# Patient Record
Sex: FEMALE | Race: WHITE
[De-identification: names, ages, dates, MRNs, and addresses within clinical notes are randomized per-mention and may not be internally consistent; named-entity substitution may affect disease eponyms.]

---

## 2019-03-08 ENCOUNTER — HOSPITAL ENCOUNTER (OUTPATIENT)
Dept: HOSPITAL 92 - SCSULT | Age: 24
Discharge: HOME | End: 2019-03-08
Attending: NURSE PRACTITIONER
Payer: COMMERCIAL

## 2019-03-08 DIAGNOSIS — Z3A.16: ICD-10-CM

## 2019-03-08 DIAGNOSIS — Z34.02: Primary | ICD-10-CM

## 2019-03-08 PROCEDURE — 76805 OB US >/= 14 WKS SNGL FETUS: CPT

## 2019-03-08 NOTE — ULT
OB ULTRASOUND:

 

HISTORY: 

Size and dates.

 

FINDINGS: 

A single live intrauterine gestation is seen with measurements corresponding to an estimated gestatio
nal age of 16 weeks 1 day and JOHANNA at 8/22/2019.  The estimated fetal weight measures 147 gm or 5 ounc
es.

 

Fetal measurements are as follows:

BPD         3.18 cm, 16 weeks

HC         12.28 cm, 16 weeks 1 day

AC         10.24 cm, 16 weeks 2 days

FL          2.05 cm, 16 weeks 1 day

 

Fetal heart rate measures 149 b.p.m.  Placenta is anteriorly located without evidence of placenta pre
via.  Amniotic fluid within normal limits.

 

IMPRESSION: 

Single live intrauterine pregnancy of 16 weeks 1 day estimated gestational age and estimated date of 
delivery at 8/22/2019.

 

POS: OFF

## 2019-04-17 ENCOUNTER — HOSPITAL ENCOUNTER (OUTPATIENT)
Dept: HOSPITAL 92 - SCSULT | Age: 24
Discharge: HOME | End: 2019-04-17
Attending: OBSTETRICS & GYNECOLOGY
Payer: COMMERCIAL

## 2019-04-17 DIAGNOSIS — Z34.82: Primary | ICD-10-CM

## 2019-04-17 DIAGNOSIS — Z3A.21: ICD-10-CM

## 2019-04-17 PROCEDURE — 76805 OB US >/= 14 WKS SNGL FETUS: CPT

## 2019-04-17 NOTE — ULT
EXAM: 

OB ultrasound



COMPARISON: 

None



HISTORY:

Pregnant female. Evaluate size, dates, and fetal anatomy.



TECHNIQUE: Multiplanar grayscale and color Doppler transabdominal fetal ultrasound images were obtain
ed.



FINDINGS: There is a single intrauterine gestation in cephalic presentation. Cardiac Doppler demonstr
ates fetal heart tones with a fetal heart rate of 153 bpm. The placenta is located anteriorly

without evidence of placenta previa. There are a few hypoechoic areas seen within the placenta which 
may represent venous lakes or areas of fibrin deposition. There is a normal amount of amniotic

fluid with an amniotic fluid index of 10.6 cm. The cervical length measures 6.73 cm based on transabd
ominal imaging.



Fetal biometry measurements:

BPD  5.25  cm -- 22 weeks 0 days

HC  19.12  cm -- 21 weeks 3 days

AC  17.58  cm -- 22 weeks 3 days

FL  3.95  cm -- 22 weeks 5 days



The estimated gestational age by ultrasound is 21 weeks and 6 days with an JOHANNA on 8/22/2019. Gestatio
nal age by last menstrual period is also 21 weeks and 6 days.



The estimated fetal weight by ultrasound is 505 g (1 pound, 2 ounces). This represents 75th percentil
e for fetal weight.



There is suggestion of a 4 chambered heart. The visualized cerebellum, visualized portions of the fet
al spine, stomach, bilateral kidneys, urinary bladder, and cord insertion demonstrate a normal

sonographic appearance. A three-vessel cord is not well delineated on this examination, but there is 
flow on either side of the urinary bladder which suggests a three-vessel cord. No definite fetal

anomalies are seen.



IMPRESSION:

1. Single intrauterine gestation cephalic presentation with fetal heart tones documented. Estimated g
estational age by ultrasound is 21 weeks and 6 days with JOHANNA on 8/22/2019.

2. Estimated fetal weight by ultrasound is 505 g (1 pound, 2 ounces).

3. Amniotic fluid index measures 10.6 cm.



Reported By: Brayan Bryant 

Electronically Signed:  4/17/2019 3:26 PM